# Patient Record
Sex: MALE | URBAN - METROPOLITAN AREA
[De-identification: names, ages, dates, MRNs, and addresses within clinical notes are randomized per-mention and may not be internally consistent; named-entity substitution may affect disease eponyms.]

---

## 2019-07-10 ENCOUNTER — OFFICE VISIT (OUTPATIENT)
Dept: URBAN - METROPOLITAN AREA CLINIC 18 | Facility: CLINIC | Age: 64
End: 2019-07-10
Payer: COMMERCIAL

## 2019-07-10 DIAGNOSIS — H11.001 PTERYGIUM OF RIGHT EYE: ICD-10-CM

## 2019-07-10 DIAGNOSIS — H52.03 HYPERMETROPIA, BILATERAL: Primary | ICD-10-CM

## 2019-07-10 DIAGNOSIS — H11.153 PINGUECULA, BILATERAL: ICD-10-CM

## 2019-07-10 PROCEDURE — 92014 COMPRE OPH EXAM EST PT 1/>: CPT | Performed by: OPTOMETRIST

## 2019-07-10 ASSESSMENT — VISUAL ACUITY
OS: 20/25
OD: 20/20

## 2019-07-10 ASSESSMENT — KERATOMETRY
OS: 43.38
OD: 42.00

## 2019-07-10 ASSESSMENT — INTRAOCULAR PRESSURE
OS: 14
OD: 16

## 2021-02-26 ENCOUNTER — OFFICE VISIT (OUTPATIENT)
Dept: URBAN - METROPOLITAN AREA CLINIC 17 | Facility: CLINIC | Age: 66
End: 2021-02-26
Payer: OTHER MISCELLANEOUS

## 2021-02-26 DIAGNOSIS — H17.89 OTHER CORNEAL OPACITIES: ICD-10-CM

## 2021-02-26 DIAGNOSIS — H25.13 AGE-RELATED NUCLEAR CATARACT, BILATERAL: ICD-10-CM

## 2021-02-26 DIAGNOSIS — E11.9 TYPE 2 DIABETES MELLITUS W/O COMPLICATION: Primary | ICD-10-CM

## 2021-02-26 PROCEDURE — 99213 OFFICE O/P EST LOW 20 MIN: CPT | Performed by: OPTOMETRIST

## 2021-02-26 ASSESSMENT — INTRAOCULAR PRESSURE
OD: 13
OS: 13

## 2021-02-26 NOTE — IMPRESSION/PLAN
Impression: Type 2 diabetes mellitus w/o complication: F06.1. Bilateral. Plan: Diabetes type II: no background retinopathy, no signs of neovascularization noted. Discussed ocular and systemic benefits of blood sugar control.

## 2021-02-26 NOTE — IMPRESSION/PLAN
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 Left. LASIK flap Plan: Discussed diagnosis in detail with patient. No treatment is required at this time. Will continue to observe condition and or symptoms.

## 2021-04-07 ENCOUNTER — OFFICE VISIT (OUTPATIENT)
Dept: URBAN - METROPOLITAN AREA CLINIC 17 | Facility: CLINIC | Age: 66
End: 2021-04-07
Payer: COMMERCIAL

## 2021-04-07 DIAGNOSIS — G51.4 FACIAL MYOKYMIA: ICD-10-CM

## 2021-04-07 PROCEDURE — 92012 INTRM OPH EXAM EST PATIENT: CPT | Performed by: OPTOMETRIST

## 2021-04-07 ASSESSMENT — VISUAL ACUITY
OD: 20/20
OS: 20/20

## 2021-04-07 ASSESSMENT — INTRAOCULAR PRESSURE
OD: 15
OS: 14

## 2021-04-07 NOTE — IMPRESSION/PLAN
Impression: Hypermetropia, bilateral: H52.03. Plan: Discussed diagnosis in detail with patient. New SRx was given today. Discussed possible lens exchange.  Patient does not meet requirements for cataract surgery

## 2021-04-07 NOTE — IMPRESSION/PLAN
Impression: Facial myokymia: G51.4. Plan: Discussed diagnosis in detail with patient. No treatment is required at this time.

## 2022-08-29 ENCOUNTER — OFFICE VISIT (OUTPATIENT)
Dept: URBAN - METROPOLITAN AREA CLINIC 17 | Facility: CLINIC | Age: 67
End: 2022-08-29
Payer: MEDICARE

## 2022-08-29 DIAGNOSIS — H25.13 AGE-RELATED NUCLEAR CATARACT, BILATERAL: ICD-10-CM

## 2022-08-29 DIAGNOSIS — H11.001 PTERYGIUM OF RIGHT EYE: ICD-10-CM

## 2022-08-29 DIAGNOSIS — H11.153 PINGUECULA, BILATERAL: ICD-10-CM

## 2022-08-29 DIAGNOSIS — H40.033 ANATOMICAL NARROW ANGLE, BILATERAL: ICD-10-CM

## 2022-08-29 DIAGNOSIS — E11.9 TYPE 2 DIABETES MELLITUS WITHOUT COMPLICATIONS: Primary | ICD-10-CM

## 2022-08-29 DIAGNOSIS — H17.89 OTHER CORNEAL OPACITIES: ICD-10-CM

## 2022-08-29 PROCEDURE — 99214 OFFICE O/P EST MOD 30 MIN: CPT | Performed by: OPTOMETRIST

## 2022-08-29 ASSESSMENT — INTRAOCULAR PRESSURE
OS: 9
OD: 11

## 2022-08-29 NOTE — IMPRESSION/PLAN
Impression: Anatomical narrow angle, bilateral: H40.033. Plan: No treatment is required at this time. Will continue to observe condition and or symptoms. Patient is okay for dilation next year.

## 2022-08-29 NOTE — IMPRESSION/PLAN
Impression: Type 2 diabetes mellitus w/o complication: P19.3. Bilateral. Plan: Diabetes type II: no background retinopathy, no signs of neovascularization noted. Discussed ocular and systemic benefits of blood sugar control.

## 2022-08-29 NOTE — IMPRESSION/PLAN
Impression: Pterygium of right eye: H11.001. Plan: Recommend pt use PF ATs frequently (coupon given), OTC Lumify for redness. Pt understands there is no surgical tx needed.

## 2022-08-29 NOTE — IMPRESSION/PLAN
Impression: Pinguecula, bilateral: H11.153. Plan: Discussed diagnosis in detail with patient. Patient understands pinguecula are caused from sun & wind exposure. Recommend pt use PF ATs frequently (coupon given), OTC Lumify for redness. Redness is likely from preservatives in current ATs being used. Pt understands there is no surgical tx needed.

## 2022-08-29 NOTE — IMPRESSION/PLAN
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 Left. LASIK flap OS only. Bilateral. Plan: Discussed diagnosis in detail with patient. No treatment is required at this time. Will continue to observe condition and or symptoms.